# Patient Record
Sex: MALE | URBAN - METROPOLITAN AREA
[De-identification: names, ages, dates, MRNs, and addresses within clinical notes are randomized per-mention and may not be internally consistent; named-entity substitution may affect disease eponyms.]

---

## 2023-10-09 ENCOUNTER — OFFICE VISIT (OUTPATIENT)
Dept: INTERNAL MEDICINE CLINIC | Facility: CLINIC | Age: 38
End: 2023-10-09
Payer: COMMERCIAL

## 2023-10-09 VITALS
WEIGHT: 236.69 LBS | HEIGHT: 67.8 IN | DIASTOLIC BLOOD PRESSURE: 76 MMHG | OXYGEN SATURATION: 96 % | TEMPERATURE: 99 F | SYSTOLIC BLOOD PRESSURE: 116 MMHG | BODY MASS INDEX: 36.29 KG/M2 | HEART RATE: 90 BPM | RESPIRATION RATE: 16 BRPM

## 2023-10-09 DIAGNOSIS — Z00.00 ENCOUNTER FOR ANNUAL PHYSICAL EXAM: ICD-10-CM

## 2023-10-09 DIAGNOSIS — E78.5 HYPERLIPIDEMIA, UNSPECIFIED HYPERLIPIDEMIA TYPE: ICD-10-CM

## 2023-10-09 DIAGNOSIS — Z23 NEED FOR VACCINATION: ICD-10-CM

## 2023-10-09 DIAGNOSIS — Z31.41 FERTILITY TESTING: ICD-10-CM

## 2023-10-09 DIAGNOSIS — M25.531 RIGHT WRIST PAIN: ICD-10-CM

## 2023-10-09 DIAGNOSIS — Z13.0 SCREENING FOR DEFICIENCY ANEMIA: ICD-10-CM

## 2023-10-09 DIAGNOSIS — I10 PRIMARY HYPERTENSION: Primary | ICD-10-CM

## 2023-10-09 DIAGNOSIS — Z13.29 SCREENING FOR THYROID DISORDER: ICD-10-CM

## 2023-10-09 DIAGNOSIS — Z13.1 SCREENING FOR DIABETES MELLITUS: ICD-10-CM

## 2023-10-09 PROCEDURE — 3074F SYST BP LT 130 MM HG: CPT | Performed by: NURSE PRACTITIONER

## 2023-10-09 PROCEDURE — 3008F BODY MASS INDEX DOCD: CPT | Performed by: NURSE PRACTITIONER

## 2023-10-09 PROCEDURE — 99204 OFFICE O/P NEW MOD 45 MIN: CPT | Performed by: NURSE PRACTITIONER

## 2023-10-09 PROCEDURE — 3078F DIAST BP <80 MM HG: CPT | Performed by: NURSE PRACTITIONER

## 2023-10-09 PROCEDURE — 90471 IMMUNIZATION ADMIN: CPT | Performed by: NURSE PRACTITIONER

## 2023-10-09 PROCEDURE — 90686 IIV4 VACC NO PRSV 0.5 ML IM: CPT | Performed by: NURSE PRACTITIONER

## 2023-10-09 RX ORDER — FLUTICASONE PROPIONATE 50 MCG
2 SPRAY, SUSPENSION (ML) NASAL DAILY
COMMUNITY
Start: 2023-09-26

## 2023-10-09 RX ORDER — ATORVASTATIN CALCIUM 20 MG/1
20 TABLET, FILM COATED ORAL NIGHTLY
COMMUNITY
Start: 2023-07-10 | End: 2023-10-09

## 2023-10-09 RX ORDER — LOSARTAN POTASSIUM AND HYDROCHLOROTHIAZIDE 12.5; 5 MG/1; MG/1
1 TABLET ORAL DAILY
Qty: 30 TABLET | Refills: 0 | Status: SHIPPED | OUTPATIENT
Start: 2023-10-09

## 2023-10-09 RX ORDER — ATORVASTATIN CALCIUM 20 MG/1
20 TABLET, FILM COATED ORAL NIGHTLY
Qty: 30 TABLET | Refills: 0 | Status: SHIPPED | OUTPATIENT
Start: 2023-10-09

## 2023-10-09 RX ORDER — METHYLPREDNISOLONE 4 MG/1
TABLET ORAL
Qty: 1 EACH | Refills: 0 | Status: SHIPPED | OUTPATIENT
Start: 2023-10-09

## 2023-10-09 RX ORDER — LOSARTAN POTASSIUM AND HYDROCHLOROTHIAZIDE 12.5; 5 MG/1; MG/1
1 TABLET ORAL DAILY
COMMUNITY
Start: 2023-07-10 | End: 2023-10-09

## 2023-10-09 NOTE — PATIENT INSTRUCTIONS
Start the medrol pack. This is for the pain and inflammation in the right wrist.    See the hand specialist.    Apply ice to the wrist 1-2 times daily. Wear a wrist brace. Get your labs and semen analysis done. You should be fasting for at least 10 hours. If you take a multivitamin with Biotin or any biotin product it should be held for 3 days prior to getting your labs done.  If you use [facility] labs, you may schedule at [phone] or on-line at [facility].

## 2023-11-11 ENCOUNTER — LAB ENCOUNTER (OUTPATIENT)
Dept: LAB | Age: 38
End: 2023-11-11
Attending: NURSE PRACTITIONER
Payer: COMMERCIAL

## 2023-11-11 DIAGNOSIS — Z13.29 SCREENING FOR THYROID DISORDER: ICD-10-CM

## 2023-11-11 DIAGNOSIS — I10 PRIMARY HYPERTENSION: ICD-10-CM

## 2023-11-11 DIAGNOSIS — Z00.00 ENCOUNTER FOR ANNUAL PHYSICAL EXAM: ICD-10-CM

## 2023-11-11 DIAGNOSIS — E78.5 HYPERLIPIDEMIA, UNSPECIFIED HYPERLIPIDEMIA TYPE: ICD-10-CM

## 2023-11-11 DIAGNOSIS — Z13.0 SCREENING FOR DEFICIENCY ANEMIA: ICD-10-CM

## 2023-11-11 DIAGNOSIS — Z13.1 SCREENING FOR DIABETES MELLITUS: ICD-10-CM

## 2023-11-11 LAB
ALBUMIN SERPL-MCNC: 4 G/DL (ref 3.4–5)
ALBUMIN/GLOB SERPL: 1.1 {RATIO} (ref 1–2)
ALP LIVER SERPL-CCNC: 64 U/L
ALT SERPL-CCNC: 41 U/L
ANION GAP SERPL CALC-SCNC: 5 MMOL/L (ref 0–18)
AST SERPL-CCNC: 20 U/L (ref 15–37)
BASOPHILS # BLD AUTO: 0.03 X10(3) UL (ref 0–0.2)
BASOPHILS NFR BLD AUTO: 0.4 %
BILIRUB SERPL-MCNC: 1.6 MG/DL (ref 0.1–2)
BUN BLD-MCNC: 23 MG/DL (ref 9–23)
CALCIUM BLD-MCNC: 9 MG/DL (ref 8.5–10.1)
CHLORIDE SERPL-SCNC: 103 MMOL/L (ref 98–112)
CHOLEST SERPL-MCNC: 137 MG/DL (ref ?–200)
CO2 SERPL-SCNC: 30 MMOL/L (ref 21–32)
CREAT BLD-MCNC: 1.27 MG/DL
EGFRCR SERPLBLD CKD-EPI 2021: 73 ML/MIN/1.73M2 (ref 60–?)
EOSINOPHIL # BLD AUTO: 0.06 X10(3) UL (ref 0–0.7)
EOSINOPHIL NFR BLD AUTO: 0.8 %
ERYTHROCYTE [DISTWIDTH] IN BLOOD BY AUTOMATED COUNT: 13.8 %
EST. AVERAGE GLUCOSE BLD GHB EST-MCNC: 128 MG/DL (ref 68–126)
FASTING PATIENT LIPID ANSWER: YES
FASTING STATUS PATIENT QL REPORTED: YES
GLOBULIN PLAS-MCNC: 3.5 G/DL (ref 2.8–4.4)
GLUCOSE BLD-MCNC: 110 MG/DL (ref 70–99)
HBA1C MFR BLD: 6.1 % (ref ?–5.7)
HCT VFR BLD AUTO: 44.2 %
HDLC SERPL-MCNC: 43 MG/DL (ref 40–59)
HGB BLD-MCNC: 15.1 G/DL
IMM GRANULOCYTES # BLD AUTO: 0.03 X10(3) UL (ref 0–1)
IMM GRANULOCYTES NFR BLD: 0.4 %
LDLC SERPL CALC-MCNC: 61 MG/DL (ref ?–100)
LYMPHOCYTES # BLD AUTO: 1.84 X10(3) UL (ref 1–4)
LYMPHOCYTES NFR BLD AUTO: 25.3 %
MCH RBC QN AUTO: 28.3 PG (ref 26–34)
MCHC RBC AUTO-ENTMCNC: 34.2 G/DL (ref 31–37)
MCV RBC AUTO: 82.9 FL
MONOCYTES # BLD AUTO: 0.8 X10(3) UL (ref 0.1–1)
MONOCYTES NFR BLD AUTO: 11 %
NEUTROPHILS # BLD AUTO: 4.51 X10 (3) UL (ref 1.5–7.7)
NEUTROPHILS # BLD AUTO: 4.51 X10(3) UL (ref 1.5–7.7)
NEUTROPHILS NFR BLD AUTO: 62.1 %
NONHDLC SERPL-MCNC: 94 MG/DL (ref ?–130)
OSMOLALITY SERPL CALC.SUM OF ELEC: 290 MOSM/KG (ref 275–295)
PLATELET # BLD AUTO: 274 10(3)UL (ref 150–450)
POTASSIUM SERPL-SCNC: 3.2 MMOL/L (ref 3.5–5.1)
PROT SERPL-MCNC: 7.5 G/DL (ref 6.4–8.2)
RBC # BLD AUTO: 5.33 X10(6)UL
SODIUM SERPL-SCNC: 138 MMOL/L (ref 136–145)
TRIGL SERPL-MCNC: 203 MG/DL (ref 30–149)
TSI SER-ACNC: 0.65 MIU/ML (ref 0.36–3.74)
VLDLC SERPL CALC-MCNC: 30 MG/DL (ref 0–30)
WBC # BLD AUTO: 7.3 X10(3) UL (ref 4–11)

## 2023-11-11 PROCEDURE — 36415 COLL VENOUS BLD VENIPUNCTURE: CPT

## 2023-11-11 PROCEDURE — 84443 ASSAY THYROID STIM HORMONE: CPT

## 2023-11-11 PROCEDURE — 83036 HEMOGLOBIN GLYCOSYLATED A1C: CPT

## 2023-11-11 PROCEDURE — 80061 LIPID PANEL: CPT

## 2023-11-11 PROCEDURE — 80053 COMPREHEN METABOLIC PANEL: CPT

## 2023-11-11 PROCEDURE — 85025 COMPLETE CBC W/AUTO DIFF WBC: CPT

## 2023-11-14 RX ORDER — LOSARTAN POTASSIUM AND HYDROCHLOROTHIAZIDE 12.5; 5 MG/1; MG/1
1 TABLET ORAL DAILY
Qty: 14 TABLET | Refills: 0 | Status: SHIPPED | OUTPATIENT
Start: 2023-11-14

## 2023-11-14 NOTE — TELEPHONE ENCOUNTER
Last OV relevant to medication: 10/9/23  Last refill date: 10/9/23 #30/refills: 0  When pt was asked to return for OV: 11/9/23   Upcoming appt/reason:   Future Appointments   Date Time Provider Ezequiel Chinyere   11/21/2023  4:00 PM Arsenio Fulton MD [facility] [place]   Was pt informed of any over due labs: utd  Lab Results   Component Value Date     (H) 11/11/2023    BUN 23 11/11/2023    CREATSERUM 1.27 11/11/2023    ANIONGAP 5 11/11/2023    CA 9.0 11/11/2023    OSMOCALC 290 11/11/2023    ALKPHO 64 11/11/2023    AST 20 11/11/2023    ALT 41 11/11/2023    BILT 1.6 11/11/2023    TP 7.5 11/11/2023    ALB 4.0 11/11/2023    GLOBULIN 3.5 11/11/2023     11/11/2023    K 3.2 (L) 11/11/2023     11/11/2023    CO2 30.0 11/11/2023     Looks like pt is establishing with new pcp 11/21, okay to send short term supply? Pended with note to pharmacy, thanks!

## 2023-11-21 ENCOUNTER — OFFICE VISIT (OUTPATIENT)
Dept: INTERNAL MEDICINE CLINIC | Facility: CLINIC | Age: 38
End: 2023-11-21
Payer: COMMERCIAL

## 2023-11-21 VITALS
BODY MASS INDEX: 35.92 KG/M2 | DIASTOLIC BLOOD PRESSURE: 80 MMHG | HEIGHT: 68 IN | WEIGHT: 237 LBS | TEMPERATURE: 99 F | HEART RATE: 106 BPM | SYSTOLIC BLOOD PRESSURE: 136 MMHG | OXYGEN SATURATION: 98 %

## 2023-11-21 DIAGNOSIS — R73.03 PREDIABETES: ICD-10-CM

## 2023-11-21 DIAGNOSIS — R70.0 ESR RAISED: ICD-10-CM

## 2023-11-21 DIAGNOSIS — R09.89 RHONCHI: ICD-10-CM

## 2023-11-21 DIAGNOSIS — E55.9 VITAMIN D DEFICIENCY: ICD-10-CM

## 2023-11-21 DIAGNOSIS — E66.9 OBESITY (BMI 30-39.9): ICD-10-CM

## 2023-11-21 DIAGNOSIS — R79.89 LOW TESTOSTERONE IN MALE: Primary | ICD-10-CM

## 2023-11-21 DIAGNOSIS — E87.6 HYPOKALEMIA: ICD-10-CM

## 2023-11-21 DIAGNOSIS — I10 HYPERTENSION, UNSPECIFIED TYPE: ICD-10-CM

## 2023-11-21 DIAGNOSIS — Z31.41 FERTILITY TESTING: ICD-10-CM

## 2023-11-21 PROCEDURE — 3008F BODY MASS INDEX DOCD: CPT | Performed by: INTERNAL MEDICINE

## 2023-11-21 PROCEDURE — 3075F SYST BP GE 130 - 139MM HG: CPT | Performed by: INTERNAL MEDICINE

## 2023-11-21 PROCEDURE — 99204 OFFICE O/P NEW MOD 45 MIN: CPT | Performed by: INTERNAL MEDICINE

## 2023-11-21 PROCEDURE — 3079F DIAST BP 80-89 MM HG: CPT | Performed by: INTERNAL MEDICINE

## 2023-11-22 ENCOUNTER — PATIENT OUTREACH (OUTPATIENT)
Dept: CASE MANAGEMENT | Age: 38
End: 2023-11-22

## 2023-11-22 ENCOUNTER — TELEPHONE (OUTPATIENT)
Dept: INTERNAL MEDICINE CLINIC | Facility: CLINIC | Age: 38
End: 2023-11-22

## 2023-11-22 NOTE — PROCEDURES
The office order for PCP removal request is Approved and finalized on November 22, 2023.     Thanks,  [facility] [employer]

## 2023-11-25 ENCOUNTER — HOSPITAL ENCOUNTER (OUTPATIENT)
Dept: GENERAL RADIOLOGY | Facility: HOSPITAL | Age: 38
Discharge: HOME | End: 2023-11-25
Attending: INTERNAL MEDICINE
Payer: COMMERCIAL

## 2023-11-25 ENCOUNTER — LAB ENCOUNTER (OUTPATIENT)
Dept: LAB | Age: 38
End: 2023-11-25
Attending: INTERNAL MEDICINE
Payer: COMMERCIAL

## 2023-11-25 DIAGNOSIS — E87.6 HYPOKALEMIA: ICD-10-CM

## 2023-11-25 DIAGNOSIS — R09.89 RHONCHI: ICD-10-CM

## 2023-11-25 DIAGNOSIS — R70.0 ESR RAISED: ICD-10-CM

## 2023-11-25 DIAGNOSIS — Z31.41 FERTILITY TESTING: ICD-10-CM

## 2023-11-25 DIAGNOSIS — E55.9 VITAMIN D DEFICIENCY: ICD-10-CM

## 2023-11-25 DIAGNOSIS — R79.89 LOW TESTOSTERONE IN MALE: ICD-10-CM

## 2023-11-25 LAB
ANION GAP SERPL CALC-SCNC: 5 MMOL/L (ref 0–18)
BUN BLD-MCNC: 19 MG/DL (ref 9–23)
CALCIUM BLD-MCNC: 8.9 MG/DL (ref 8.5–10.1)
CHLORIDE SERPL-SCNC: 110 MMOL/L (ref 98–112)
CO2 SERPL-SCNC: 26 MMOL/L (ref 21–32)
CREAT BLD-MCNC: 1.05 MG/DL
CRP SERPL-MCNC: <0.29 MG/DL (ref ?–0.3)
EGFRCR SERPLBLD CKD-EPI 2021: 92 ML/MIN/1.73M2 (ref 60–?)
ERYTHROCYTE [SEDIMENTATION RATE] IN BLOOD: 8 MM/HR
FASTING STATUS PATIENT QL REPORTED: NO
GLUCOSE BLD-MCNC: 104 MG/DL (ref 70–99)
MAGNESIUM SERPL-MCNC: 2.2 MG/DL (ref 1.6–2.6)
OSMOLALITY SERPL CALC.SUM OF ELEC: 295 MOSM/KG (ref 275–295)
POTASSIUM SERPL-SCNC: 3.5 MMOL/L (ref 3.5–5.1)
SODIUM SERPL-SCNC: 141 MMOL/L (ref 136–145)
VIT D+METAB SERPL-MCNC: 7.4 NG/ML (ref 30–100)

## 2023-11-25 PROCEDURE — 83735 ASSAY OF MAGNESIUM: CPT

## 2023-11-25 PROCEDURE — 82306 VITAMIN D 25 HYDROXY: CPT

## 2023-11-25 PROCEDURE — 86038 ANTINUCLEAR ANTIBODIES: CPT

## 2023-11-25 PROCEDURE — 80048 BASIC METABOLIC PNL TOTAL CA: CPT

## 2023-11-25 PROCEDURE — 85652 RBC SED RATE AUTOMATED: CPT

## 2023-11-25 PROCEDURE — 84403 ASSAY OF TOTAL TESTOSTERONE: CPT

## 2023-11-25 PROCEDURE — 36415 COLL VENOUS BLD VENIPUNCTURE: CPT

## 2023-11-25 PROCEDURE — 86140 C-REACTIVE PROTEIN: CPT

## 2023-11-25 PROCEDURE — 71046 X-RAY EXAM CHEST 2 VIEWS: CPT | Performed by: INTERNAL MEDICINE

## 2023-11-25 PROCEDURE — 84402 ASSAY OF FREE TESTOSTERONE: CPT

## 2023-11-27 ENCOUNTER — TELEPHONE (OUTPATIENT)
Dept: INTERNAL MEDICINE CLINIC | Facility: CLINIC | Age: 38
End: 2023-11-27

## 2023-11-27 DIAGNOSIS — E55.9 VITAMIN D DEFICIENCY: ICD-10-CM

## 2023-11-27 DIAGNOSIS — Z51.81 MEDICATION MONITORING ENCOUNTER: ICD-10-CM

## 2023-11-27 DIAGNOSIS — R73.03 PREDIABETES: Primary | ICD-10-CM

## 2023-11-27 DIAGNOSIS — I10 PRIMARY HYPERTENSION: ICD-10-CM

## 2023-11-27 DIAGNOSIS — E66.9 OBESITY (BMI 30-39.9): ICD-10-CM

## 2023-11-27 LAB — NUCLEAR IGG TITR SER IF: NEGATIVE {TITER}

## 2023-11-27 RX ORDER — LOSARTAN POTASSIUM AND HYDROCHLOROTHIAZIDE 12.5; 5 MG/1; MG/1
1 TABLET ORAL DAILY
Qty: 90 TABLET | Refills: 3 | Status: SHIPPED | OUTPATIENT
Start: 2023-11-27

## 2023-11-27 RX ORDER — LANCETS 33 GAUGE
1 EACH MISCELLANEOUS DAILY
Qty: 100 EACH | Refills: 0 | Status: SHIPPED | OUTPATIENT
Start: 2023-11-27 | End: 2024-11-26

## 2023-11-27 RX ORDER — LANCETS 30 GAUGE
1 EACH MISCELLANEOUS DAILY
Qty: 1 KIT | Refills: 0 | COMMUNITY
Start: 2023-11-27 | End: 2024-11-26

## 2023-11-27 RX ORDER — BLOOD SUGAR DIAGNOSTIC
STRIP MISCELLANEOUS
Qty: 100 EACH | Refills: 0 | Status: SHIPPED | OUTPATIENT
Start: 2023-11-27

## 2023-11-27 NOTE — TELEPHONE ENCOUNTER
Spoke to Ray Lance, on verbal release. States patient began to feel fatigued/weak yesterday after taking Metformin. Now taking it bid. Also felt dizzy but resolved. Denies chills, fever, body/head aches, CP, SOB, vision changes, sweats, tremors, GI symptoms. States patient felt better today and is currently at work. Recent glucose reading on 11/25, 105. Patient does not have glucometer at home. Pended kit to pharmacy. States BP readings have been normal but does not have results available. Advised patient to monitor symptoms and inform us if recurs. Reviewed recent labs/low vitamin D levels and explained sometimes low vitamin D can cause fatigue. Ray Lance, wondering if they can also have referral for nutritionist? Pended order.

## 2023-11-27 NOTE — TELEPHONE ENCOUNTER
Stacie Louise spouse   Ph [phone]    Patient started metformin 11/25/23  Twice daily, once in am with meal, once in pm with meal   C/O weakness, fatigue and dizziness   Onset yesterday approx noon  Did check blood pressure at home after sxs onset, Allen Mcguire did not have reading available at time of call, does state it was normal for him    Also asks for referral to nutritionist for weight loss and diet modification     Call Allen Mcguire with [facility] - okay per verbal release    : Urszula Shannon [phone]
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
## 2023-11-27 NOTE — TELEPHONE ENCOUNTER
Spoke to [facility] and informed. Also requesting semen analysis order to be faxed to Dr Della Yan to schedule.    Fax [phone]

## 2023-11-27 NOTE — TELEPHONE ENCOUNTER
Agree with recs. Please also ask him to reduce metformin to 500mg daily   Signed orders for glucometer and nutritionist.  Thanks!

## 2023-11-29 DIAGNOSIS — E78.5 HYPERLIPIDEMIA, UNSPECIFIED HYPERLIPIDEMIA TYPE: ICD-10-CM

## 2023-11-29 LAB
FREE TESTOST DIRECT: 7.7 PG/ML
TESTOSTERONE: 274 NG/DL

## 2023-11-30 RX ORDER — ATORVASTATIN CALCIUM 20 MG/1
20 TABLET, FILM COATED ORAL NIGHTLY
Qty: 30 TABLET | Refills: 0 | OUTPATIENT
Start: 2023-11-30

## 2024-01-08 ENCOUNTER — LAB ENCOUNTER (OUTPATIENT)
Dept: LAB | Facility: HOSPITAL | Age: 39
End: 2024-01-08
Attending: INTERNAL MEDICINE
Payer: COMMERCIAL

## 2024-01-08 DIAGNOSIS — Z31.41 FERTILITY TESTING: ICD-10-CM

## 2024-01-08 PROCEDURE — 89320 SEMEN ANAL VOL/COUNT/MOT: CPT

## 2024-01-11 ENCOUNTER — TELEPHONE (OUTPATIENT)
Dept: INTERNAL MEDICINE CLINIC | Facility: CLINIC | Age: 39
End: 2024-01-11

## 2024-01-11 NOTE — TELEPHONE ENCOUNTER
Please let him know that we received fax with semen analysis. Per report it was \"essentially normal semen analysis.\" Should follow -up with urology as directed.   Will fax over copy to urology office; per chart review he is scheduled with Dr. Goff

## 2024-01-11 NOTE — TELEPHONE ENCOUNTER
Outgoing Fax  I faxed over lab report received for Semen analysis per Dr. Zamorano asked me to fax report STATUS CP placed copy to scan and another copy in accrodion

## 2024-01-17 ENCOUNTER — OFFICE VISIT (OUTPATIENT)
Dept: SURGERY | Facility: CLINIC | Age: 39
End: 2024-01-17

## 2024-01-17 DIAGNOSIS — R79.89 LOW TESTOSTERONE IN MALE: Primary | ICD-10-CM

## 2024-01-17 LAB
APPEARANCE: CLEAR
BILIRUBIN: NEGATIVE
GLUCOSE (URINE DIPSTICK): NEGATIVE MG/DL
KETONES (URINE DIPSTICK): NEGATIVE MG/DL
LEUKOCYTES: NEGATIVE
MULTISTIX LOT#: NORMAL NUMERIC
NITRITE, URINE: NEGATIVE
OCCULT BLOOD: NEGATIVE
PH, URINE: 5.5 (ref 4.5–8)
PROTEIN (URINE DIPSTICK): NEGATIVE MG/DL
SPECIFIC GRAVITY: 1.02 (ref 1–1.03)
URINE-COLOR: YELLOW
UROBILINOGEN,SEMI-QN: 0.2 MG/DL (ref 0–1.9)

## 2024-01-17 PROCEDURE — 99243 OFF/OP CNSLTJ NEW/EST LOW 30: CPT | Performed by: UROLOGY

## 2024-01-17 PROCEDURE — 81003 URINALYSIS AUTO W/O SCOPE: CPT | Performed by: UROLOGY

## 2024-01-17 NOTE — PROGRESS NOTES
Urology Clinic Note - New Patient    Referring Provider:  Aviva Canas MD  [address]  [address]  [place] [address]     Primary Care Provider:  Aviva Canas MD      Swedish interpretor used.      Chief Complaint:     Infertility, concern for low testosterone     HPI:     Shailesh Amin is a 38 year old male with history of HTN, HLD referred for infertility, low testosterone.     Patient reports seeing a urologist in [facility] many years ago for retractile testicles; this has resolved.   Patient reports long history of infertility- 10 years of intercourse without protection with wife. He also recently reported decreated energy, weight gain.  Had a SA 1/8/24 wt normal results (with Dr. Carranza's office), he brings results in today (see below for full results) which demonstrated overall normal findings; low normal forms by Krugers morphology (5).     Of note, his partner is 34; she has not had a previous pregnancy and has not been evaluated yet but is seeing ob/gyn for this in a couple weeks.   Patient also with recent low libido, low energy, weight gain. He also had T checked by PCP;  274.   He has no other urology. No urinary complaints. No ED.          PSA:  No results found for: \"PSA\", \"PERCENTPSA\", \"PSAS\", \"PSAULTRA\", \"QPSA\", \"PSATOT\", \"TOTPSADX\", \"TOTPSASCREEN\"   Last Cr was 1.05 done on 11/25/2023.      History:     Past Medical History:   Diagnosis Date    Essential hypertension     Hyperlipidemia        Past Surgical History:   Procedure Laterality Date    TONSILLECTOMY         Family History   Problem Relation Age of Onset    Hypertension Mother     Kidney Cancer Mother         on Dialysis    Heart Attack Father     No Known Problems Sister        Social History     Socioeconomic History    Marital status:    Tobacco Use    Smoking status: Never     Passive exposure: Never    Smokeless tobacco: Never   Vaping Use    Vaping Use: Never used   Substance and Sexual  Activity    Alcohol use: Not Currently    Drug use: Never    Sexual activity: Yes     Partners: Female   Other Topics Concern     Service No    Blood Transfusions No    Caffeine Concern No    Occupational Exposure No    Hobby Hazards No    Sleep Concern No    Stress Concern No    Special Diet No    Back Care Yes    Exercise No    Bike Helmet Yes    Seat Belt Yes    Self-Exams Yes       Medications (Active prior to today's visit):  Current Outpatient Medications   Medication Sig Dispense Refill    Blood Glucose Monitoring Suppl (ONETOUCH ULTRA 2) w/Device Does not apply Kit 1 Device by Other route daily. 1 kit 0    ergocalciferol 1.25 MG (36045 UT) Oral Cap Take 1 capsule (50,000 Units total) by mouth once a week for 12 doses. 12 capsule 0    losartan-hydroCHLOROthiazide 50-12.5 MG Oral Tab Take 1 tablet by mouth daily. 90 tablet 3    Glucose Blood (ONETOUCH ULTRA) In Vitro Strip Tests 1 x daily 100 each 0    OneTouch Delica Lancets 33G Does not apply Misc 1 Lancet by Finger stick route daily. 100 each 0    metFORMIN 500 MG Oral Tab Take 1 tablet (500 mg total) by mouth daily with breakfast.      atorvastatin 20 MG Oral Tab Take 1 tablet (20 mg total) by mouth nightly. 30 tablet 0       Allergies:  No Known Allergies      Review of Systems:   A comprehensive 10-point review of systems was completed.  Pertinent positives and negatives are noted in the the HPI.    Physical Exam:   CONSTITUTIONAL: Well developed, well nourished, in no acute distress  NEUROLOGIC: Alert and oriented  HEAD: Normocephalic, atraumatic  ENT: Hearing intact   RESPIRATORY: Normal respiratory effort  SKIN: No evident rashes  ABDOMEN: Soft, non-tender, non-distended  GENITOURINARY: Normal phallus, orthotopic meatus, normal bilateral testicles; in dependent portion of scrotum    No results found.              Assessment & Plan:     Shailesh Amin is a 38 year old male with history of HTN, HLD referred for infertility, low  testosterone.     # Discussed patients history in detail. His exam today is favorable. Unclear etiology of infertility; SA Is overall reassuring even with low Krugers morphology. I encouraged patient and wife to continue with workup for wifes fertility (not yet worked up). After this would likely continue with plans for IVF/ICI if continued issues with natural conception.   Patient also with low T which appears symptomatic. We discussed T therapy and the risk of worsening infertility; will check LH, prolactin, estradiol, fsh. Will send to endocrinology for consideration of clomid and any further testing that may be indicated. He will return in a few weeks to discuss the above.       Thank you for this consult.    I have personally reviewed all relevant medical records, labs, and imaging.       Chele Goff MD  Staff Urologist  [facility]  Office: [phone]

## 2024-02-15 RX ORDER — ERGOCALCIFEROL 1.25 MG/1
50000 CAPSULE ORAL WEEKLY
Qty: 12 CAPSULE | Refills: 0 | OUTPATIENT
Start: 2024-02-15 | End: 2024-05-03

## 2024-02-20 DIAGNOSIS — R73.03 PREDIABETES: ICD-10-CM

## 2024-02-20 DIAGNOSIS — E66.9 OBESITY (BMI 30-39.9): ICD-10-CM

## 2024-02-21 NOTE — TELEPHONE ENCOUNTER
PROTOCOL FAILED    Name from pharmacy: METFORMIN  MG TABLET         Will file in chart as: METFORMIN 500 MG Oral Tab    Sig: TAKE 1 TABLET BY MOUTH TWICE A DAY WITH MEALS    Disp: 180 tablet    Refills: 0 (Pharmacy requested: Not specified)    Start: 2/20/2024    Class: Normal    Non-formulary For: Prediabetes, Obesity (BMI 30-39.9)    Last ordered: 2 months ago (11/27/2023) by Aviva Cruz MD    Last refill: 11/21/2023    Rx #: [id number]    Diabetes Medication Protocol Ejbbzu9702/20/2024 01:01 AM   Protocol Details Microalbumin procedure in past 12 months or taking ACE/ARB    Last A1C < 7.5 and within past 6 months    In person appointment or virtual visit in the past 6 mos or appointment in next 3 mos    EGFRCR or GFRNAA > 50    GFR in the past 12 months      To be filled at: [facility] [address] [place], [place] - [address] [phone], [phone]        LOV: 11/21/23 w/ DV  RTC: 01/02/24   RECENT LABS: 11/25/23   FOV: 03/04/24

## 2024-02-24 ENCOUNTER — LAB ENCOUNTER (OUTPATIENT)
Dept: LAB | Age: 39
End: 2024-02-24
Attending: INTERNAL MEDICINE
Payer: COMMERCIAL

## 2024-02-24 DIAGNOSIS — E55.9 VITAMIN D DEFICIENCY: ICD-10-CM

## 2024-02-24 LAB — VIT D+METAB SERPL-MCNC: 44.1 NG/ML (ref 30–100)

## 2024-02-24 PROCEDURE — 82306 VITAMIN D 25 HYDROXY: CPT

## 2024-02-24 PROCEDURE — 36415 COLL VENOUS BLD VENIPUNCTURE: CPT

## 2024-03-04 ENCOUNTER — OFFICE VISIT (OUTPATIENT)
Dept: INTERNAL MEDICINE CLINIC | Facility: CLINIC | Age: 39
End: 2024-03-04
Payer: COMMERCIAL

## 2024-03-04 VITALS
WEIGHT: 234.38 LBS | RESPIRATION RATE: 15 BRPM | OXYGEN SATURATION: 95 % | HEART RATE: 75 BPM | TEMPERATURE: 98 F | HEIGHT: 68 IN | BODY MASS INDEX: 35.52 KG/M2 | SYSTOLIC BLOOD PRESSURE: 128 MMHG | DIASTOLIC BLOOD PRESSURE: 80 MMHG

## 2024-03-04 DIAGNOSIS — E34.9 TESTOSTERONE INSUFFICIENCY: ICD-10-CM

## 2024-03-04 DIAGNOSIS — E78.5 HYPERLIPIDEMIA, UNSPECIFIED HYPERLIPIDEMIA TYPE: ICD-10-CM

## 2024-03-04 DIAGNOSIS — I10 HYPERTENSION, UNSPECIFIED TYPE: ICD-10-CM

## 2024-03-04 DIAGNOSIS — R73.03 PREDIABETES: Primary | ICD-10-CM

## 2024-03-04 DIAGNOSIS — Z78.9 ATTEMPTING TO CONCEIVE: ICD-10-CM

## 2024-03-04 PROCEDURE — 99214 OFFICE O/P EST MOD 30 MIN: CPT | Performed by: INTERNAL MEDICINE

## 2024-03-04 RX ORDER — ATORVASTATIN CALCIUM 20 MG/1
20 TABLET, FILM COATED ORAL NIGHTLY
Qty: 30 TABLET | Refills: 0 | Status: SHIPPED | OUTPATIENT
Start: 2024-03-04

## 2024-03-04 NOTE — PROGRESS NOTES
Subjective:   Shailesh Amin is a 38 year old male  who presents for Test Results     Hx of vitamin d deficiency- on supplements   Feels better     preDM- on metformin   Due for labs soon.     Low normal testosterone - saw urology. Recommended to see endocrinology.   He and wife have been trying to conceive.     History/Other:    Chief Complaint Reviewed and Verified  No Further Nursing Notes to   Review  Tobacco Reviewed  Allergies Reviewed  Medications Reviewed    Medical History Reviewed  Surgical History Reviewed  Family History   Reviewed  Social History Reviewed         Current Outpatient Medications   Medication Sig Dispense Refill    METFORMIN 500 MG Oral Tab TAKE 1 TABLET BY MOUTH TWICE A DAY WITH MEALS 180 tablet 0    losartan-hydroCHLOROthiazide 50-12.5 MG Oral Tab Take 1 tablet by mouth daily. 90 tablet 3    atorvastatin 20 MG Oral Tab Take 1 tablet (20 mg total) by mouth nightly. 30 tablet 0    Blood Glucose Monitoring Suppl (ONETOUCH ULTRA 2) w/Device Does not apply Kit 1 Device by Other route daily. (Patient not taking: Reported on 3/4/2024) 1 kit 0    Glucose Blood (ONETOUCH ULTRA) In Vitro Strip Tests 1 x daily (Patient not taking: Reported on 3/4/2024) 100 each 0    OneTouch Delica Lancets 33G Does not apply Misc 1 Lancet by Finger stick route daily. (Patient not taking: Reported on 3/4/2024) 100 each 0       Review of Systems:  Pertinent items are noted in HPI.  A comprehensive 10 point review of systems was completed.  Pertinent positives and negatives noted in the the HPI.        Objective:   /80 (BP Location: Left arm, Patient Position: Sitting, Cuff Size: adult)   Pulse 75   Temp 98.2 °F (36.8 °C) (Temporal)   Resp 15   Ht 5' 8\" (1.727 m)   Wt 234 lb 6.4 oz (106.3 kg)   SpO2 95%   BMI 35.64 kg/m²  Estimated body mass index is 35.64 kg/m² as calculated from the following:    Height as of this encounter: 5' 8\" (1.727 m).    Weight as of this encounter: 234 lb  6.4 oz (106.3 kg).  PHYSICAL EXAM:   General: no acute distress   Respiratory: no increased work of breathing; good air exchange; CTAB; no crackles or wheezing   Cardiovascular: RRR; S1, S2; no murmurs  Neurological: awake, alert, oriented x3; CNII-XII grossly intact;   Behavioral/Psych: euthymic; appropriate affect     Assessment & Plan:     Shailesh Stevenson was seen today for test results.    Diagnoses and all orders for this visit:    Prediabetes  -     Hemoglobin A1C [E]; Future  -     Basic Metabolic Panel (8) [E]; Future  -metformin   -diet/exercise     Hyperlipidemia, unspecified hyperlipidemia type  -     atorvastatin 20 MG Oral Tab; Take 1 tablet (20 mg total) by mouth nightly.  -     Lipid Panel [E]; Future    Testosterone borderline  Attempting to conceive   -     Endocrine Referral - [facility] (Heidi Olea)    HTN- controlled. Continue losartan-hydrochlorothiazide       Aviva Cruz MD

## 2024-04-04 DIAGNOSIS — E78.5 HYPERLIPIDEMIA, UNSPECIFIED HYPERLIPIDEMIA TYPE: ICD-10-CM

## 2024-04-04 RX ORDER — ATORVASTATIN CALCIUM 20 MG/1
20 TABLET, FILM COATED ORAL NIGHTLY
Qty: 90 TABLET | Refills: 0 | Status: SHIPPED | OUTPATIENT
Start: 2024-04-04

## 2024-04-04 NOTE — TELEPHONE ENCOUNTER
Atorvastatin 20 mg  Filled 3-4-24  Qty 30  0 refills  No future appointments.  LOV 3-4-24 DV  RTC none  Labs 11-11-23 Lipid

## 2024-05-21 DIAGNOSIS — E66.9 OBESITY (BMI 30-39.9): ICD-10-CM

## 2024-05-21 DIAGNOSIS — R73.03 PREDIABETES: ICD-10-CM

## 2024-05-21 NOTE — TELEPHONE ENCOUNTER
Requesting    Name from pharmacy: METFORMIN  MG TABLET         Will file in chart as: METFORMIN 500 MG Oral Tab    Sig: TAKE 1 TABLET BY MOUTH TWICE A DAY WITH FOOD    Disp: 180 tablet    Refills: 0 (Pharmacy requested: Not specified)    Start: 5/21/2024    Class: Normal    Non-formulary For: Prediabetes, Obesity (BMI 30-39.9)    Last ordered: 3 months ago (2/21/2024) by Aviva Cruz MD    Last refill: 2/21/2024    Rx #: [id number]    Diabetes Medication Protocol Quyfgy5105/21/2024 12:35 AM   Protocol Details Last A1C < 7.5 and within past 6 months    Microalbumin procedure in past 12 months or taking ACE/ARB    In person appointment or virtual visit in the past 6 mos or appointment in next 3 mos    EGFRCR or GFRNAA > 50    GFR in the past 12 months      To be filled at: [facility] [address] [address] [address] [phone], [phone]     LOV: 03/04/24 w/ DV  RTC: No Follow Up on File   Last Relevant Labs: 11/25/23  Filled: 02/21/24 #180 with 0 refills    No future appointments.

## 2024-07-04 DIAGNOSIS — E78.5 HYPERLIPIDEMIA, UNSPECIFIED HYPERLIPIDEMIA TYPE: ICD-10-CM

## 2024-07-05 NOTE — TELEPHONE ENCOUNTER
Requesting    Name from pharmacy: ATORVASTATIN 20 MG TABLET         Will file in chart as: ATORVASTATIN 20 MG Oral Tab    Sig: TAKE 1 TABLET BY MOUTH EVERY DAY AT NIGHT    Disp: 90 tablet    Refills: 0 (Pharmacy requested: Not specified)    Start: 7/4/2024    Class: Normal    Non-formulary For: Hyperlipidemia, unspecified hyperlipidemia type    Last ordered: 3 months ago (4/4/2024) by Aviva Cruz MD    Last refill: 4/4/2024    Rx #: [id number]    Cholesterol Medication Protocol Iprqbw5307/04/2024 11:17 AM   Protocol Details ALT < 80    ALT resulted within past year    Lipid panel within past 12 months    In person appointment or virtual visit in the past 12 mos or appointment in next 3 mos      To be filled at: [facility] [address] [facility] [address] [phone], [phone]     LOV: 03/04/24 w/ DV  RTC: No Follow Up on File   Last Relevant Labs: 11/25/23  Filled: 04/04/24 #90 with 0 refills    No future appointments.

## 2024-07-07 RX ORDER — ATORVASTATIN CALCIUM 20 MG/1
20 TABLET, FILM COATED ORAL NIGHTLY
Qty: 90 TABLET | Refills: 0 | Status: SHIPPED | OUTPATIENT
Start: 2024-07-07

## 2024-08-14 DIAGNOSIS — I10 PRIMARY HYPERTENSION: ICD-10-CM

## 2024-08-14 NOTE — TELEPHONE ENCOUNTER
Requesting refill for:   losartan-hydroCHLOROthiazide 50-12.5 MG Oral Tab 90 tablet    Pharmacy: [facility] [id number] [facility]     # [phone]

## 2024-08-15 RX ORDER — LOSARTAN POTASSIUM AND HYDROCHLOROTHIAZIDE 12.5; 5 MG/1; MG/1
1 TABLET ORAL DAILY
Qty: 90 TABLET | Refills: 0 | Status: SHIPPED | OUTPATIENT
Start: 2024-08-15

## 2024-08-15 NOTE — TELEPHONE ENCOUNTER
Requesting    losartan-hydroCHLOROthiazide 50-12.5 MG Oral Tab         Sig: Take 1 tablet by mouth daily.    Disp: 90 tablet    Refills: 0    Start: 8/15/2024    Class: Normal    Non-formulary For: Primary hypertension    Last ordered: 8 months ago (11/27/2023) by Aviva Cruz MD    Hypertension Medications Protocol Vsehig20/15/2024 10:47 AM   Protocol Details CMP or BMP in past 12 months    Last BP reading less than 140/90    In person appointment or virtual visit in the past 12 mos or appointment in next 3 mos    EGFRCR or GFRNAA > 50      To be filled at: [facility] [address] [place] [facility] [address] [phone], [phone]        LOV: 03/04/24 w/ DVF  RTC: No Follow Up on File   Last Relevant Labs: 11/2023  Filled: 11/27/23 #90 with 3 refills    Future Appointments   Date Time Provider Department Center   8/21/2024  3:00 PM Aviva Canas MD [facility] [place]

## 2024-08-18 ENCOUNTER — HOSPITAL ENCOUNTER (EMERGENCY)
Facility: HOSPITAL | Age: 39
Discharge: HOME | End: 2024-08-19
Attending: EMERGENCY MEDICINE
Payer: COMMERCIAL

## 2024-08-18 DIAGNOSIS — I15.9 SECONDARY HYPERTENSION: Primary | ICD-10-CM

## 2024-08-18 DIAGNOSIS — Z76.0 ENCOUNTER FOR MEDICATION REFILL: ICD-10-CM

## 2024-08-18 DIAGNOSIS — R51.9 NONINTRACTABLE HEADACHE, UNSPECIFIED CHRONICITY PATTERN, UNSPECIFIED HEADACHE TYPE: ICD-10-CM

## 2024-08-18 PROCEDURE — 99285 EMERGENCY DEPT VISIT HI MDM: CPT

## 2024-08-18 PROCEDURE — 93010 ELECTROCARDIOGRAM REPORT: CPT

## 2024-08-18 PROCEDURE — 96375 TX/PRO/DX INJ NEW DRUG ADDON: CPT

## 2024-08-18 PROCEDURE — 96374 THER/PROPH/DIAG INJ IV PUSH: CPT

## 2024-08-18 PROCEDURE — 96361 HYDRATE IV INFUSION ADD-ON: CPT

## 2024-08-19 ENCOUNTER — APPOINTMENT (OUTPATIENT)
Dept: CT IMAGING | Facility: HOSPITAL | Age: 39
End: 2024-08-19
Attending: EMERGENCY MEDICINE
Payer: COMMERCIAL

## 2024-08-19 ENCOUNTER — APPOINTMENT (OUTPATIENT)
Dept: GENERAL RADIOLOGY | Facility: HOSPITAL | Age: 39
End: 2024-08-19
Payer: COMMERCIAL

## 2024-08-19 VITALS
SYSTOLIC BLOOD PRESSURE: 135 MMHG | BODY MASS INDEX: 31 KG/M2 | TEMPERATURE: 98 F | OXYGEN SATURATION: 100 % | WEIGHT: 207 LBS | HEART RATE: 64 BPM | RESPIRATION RATE: 13 BRPM | DIASTOLIC BLOOD PRESSURE: 84 MMHG

## 2024-08-19 LAB
ALBUMIN SERPL-MCNC: 4.6 G/DL (ref 3.2–4.8)
ALBUMIN/GLOB SERPL: 2 {RATIO} (ref 1–2)
ALP LIVER SERPL-CCNC: 68 U/L
ALT SERPL-CCNC: 23 U/L
ANION GAP SERPL CALC-SCNC: 6 MMOL/L (ref 0–18)
AST SERPL-CCNC: 19 U/L (ref ?–34)
ATRIAL RATE: 70 BPM
BASOPHILS # BLD AUTO: 0.03 X10(3) UL (ref 0–0.2)
BASOPHILS NFR BLD AUTO: 0.4 %
BILIRUB SERPL-MCNC: 1.3 MG/DL (ref 0.3–1.2)
BUN BLD-MCNC: 12 MG/DL (ref 9–23)
CALCIUM BLD-MCNC: 9.3 MG/DL (ref 8.7–10.4)
CHLORIDE SERPL-SCNC: 106 MMOL/L (ref 98–112)
CO2 SERPL-SCNC: 27 MMOL/L (ref 21–32)
CREAT BLD-MCNC: 0.93 MG/DL
EGFRCR SERPLBLD CKD-EPI 2021: 106 ML/MIN/1.73M2 (ref 60–?)
EOSINOPHIL # BLD AUTO: 0.24 X10(3) UL (ref 0–0.7)
EOSINOPHIL NFR BLD AUTO: 3.5 %
ERYTHROCYTE [DISTWIDTH] IN BLOOD BY AUTOMATED COUNT: 13.5 %
GLOBULIN PLAS-MCNC: 2.3 G/DL (ref 2–3.5)
GLUCOSE BLD-MCNC: 121 MG/DL (ref 70–99)
HCT VFR BLD AUTO: 39.4 %
HGB BLD-MCNC: 13.9 G/DL
IMM GRANULOCYTES # BLD AUTO: 0.03 X10(3) UL (ref 0–1)
IMM GRANULOCYTES NFR BLD: 0.4 %
LYMPHOCYTES # BLD AUTO: 2.07 X10(3) UL (ref 1–4)
LYMPHOCYTES NFR BLD AUTO: 30.5 %
MCH RBC QN AUTO: 28.8 PG (ref 26–34)
MCHC RBC AUTO-ENTMCNC: 35.3 G/DL (ref 31–37)
MCV RBC AUTO: 81.7 FL
MONOCYTES # BLD AUTO: 0.66 X10(3) UL (ref 0.1–1)
MONOCYTES NFR BLD AUTO: 9.7 %
NEUTROPHILS # BLD AUTO: 3.76 X10 (3) UL (ref 1.5–7.7)
NEUTROPHILS # BLD AUTO: 3.76 X10(3) UL (ref 1.5–7.7)
NEUTROPHILS NFR BLD AUTO: 55.5 %
OSMOLALITY SERPL CALC.SUM OF ELEC: 289 MOSM/KG (ref 275–295)
P AXIS: 46 DEGREES
P-R INTERVAL: 168 MS
PLATELET # BLD AUTO: 206 10(3)UL (ref 150–450)
POTASSIUM SERPL-SCNC: 4 MMOL/L (ref 3.5–5.1)
PROT SERPL-MCNC: 6.9 G/DL (ref 5.7–8.2)
Q-T INTERVAL: 392 MS
QRS DURATION: 90 MS
QTC CALCULATION (BEZET): 423 MS
R AXIS: 14 DEGREES
RBC # BLD AUTO: 4.82 X10(6)UL
SARS-COV-2 RNA RESP QL NAA+PROBE: NOT DETECTED
SODIUM SERPL-SCNC: 139 MMOL/L (ref 136–145)
T AXIS: 12 DEGREES
VENTRICULAR RATE: 70 BPM
WBC # BLD AUTO: 6.8 X10(3) UL (ref 4–11)

## 2024-08-19 PROCEDURE — 85025 COMPLETE CBC W/AUTO DIFF WBC: CPT | Performed by: EMERGENCY MEDICINE

## 2024-08-19 PROCEDURE — 80053 COMPREHEN METABOLIC PANEL: CPT

## 2024-08-19 PROCEDURE — 71045 X-RAY EXAM CHEST 1 VIEW: CPT

## 2024-08-19 PROCEDURE — 85025 COMPLETE CBC W/AUTO DIFF WBC: CPT

## 2024-08-19 PROCEDURE — 93005 ELECTROCARDIOGRAM TRACING: CPT

## 2024-08-19 PROCEDURE — 80053 COMPREHEN METABOLIC PANEL: CPT | Performed by: EMERGENCY MEDICINE

## 2024-08-19 PROCEDURE — 70450 CT HEAD/BRAIN W/O DYE: CPT | Performed by: EMERGENCY MEDICINE

## 2024-08-19 RX ORDER — ONDANSETRON 2 MG/ML
4 INJECTION INTRAMUSCULAR; INTRAVENOUS ONCE
Status: COMPLETED | OUTPATIENT
Start: 2024-08-19 | End: 2024-08-19

## 2024-08-19 RX ORDER — DIPHENHYDRAMINE HYDROCHLORIDE 50 MG/ML
25 INJECTION INTRAMUSCULAR; INTRAVENOUS ONCE
Status: COMPLETED | OUTPATIENT
Start: 2024-08-19 | End: 2024-08-19

## 2024-08-19 RX ORDER — ATORVASTATIN CALCIUM 20 MG/1
20 TABLET, FILM COATED ORAL NIGHTLY
Qty: 30 TABLET | Refills: 0 | Status: SHIPPED | OUTPATIENT
Start: 2024-08-19 | End: 2024-08-21

## 2024-08-19 RX ORDER — LOSARTAN POTASSIUM AND HYDROCHLOROTHIAZIDE 12.5; 5 MG/1; MG/1
1 TABLET ORAL DAILY
Qty: 30 TABLET | Refills: 0 | Status: SHIPPED | OUTPATIENT
Start: 2024-08-19 | End: 2024-08-21

## 2024-08-19 RX ORDER — KETOROLAC TROMETHAMINE 30 MG/ML
30 INJECTION, SOLUTION INTRAMUSCULAR; INTRAVENOUS ONCE
Status: COMPLETED | OUTPATIENT
Start: 2024-08-19 | End: 2024-08-19

## 2024-08-19 NOTE — ED PROVIDER NOTES
Patient Seen in: [facility] Emergency Department      History     Chief Complaint   Patient presents with    Hypertension     Stated Complaint: ELEVATED BP AND DIZZINESS , ON MEDS 144/111 AT HOME    Subjective:   HPI    Elevated BP and global headache.  This is a 39-year-old gentleman who had an elevated blood pressure at home he has a history of hypertension hyperlipidemia he also had a headache he is not sure what came first the headache with the blood pressure because he took the blood pressure after he had a headache.  He presents to the emergency department for treatment and evaluation his wife is with him.  He denies any fevers or chills or neck pain he does not normally have headaches      Objective:   Past Medical History:    Essential hypertension    Hyperlipidemia              Past Surgical History:   Procedure Laterality Date    Tonsillectomy                  Social History     Socioeconomic History    Marital status:    Tobacco Use    Smoking status: Never     Passive exposure: Never    Smokeless tobacco: Never   Vaping Use    Vaping status: Never Used   Substance and Sexual Activity    Alcohol use: Not Currently    Drug use: Never    Sexual activity: Yes     Partners: Female   Other Topics Concern     Service No    Blood Transfusions No    Caffeine Concern No    Occupational Exposure No    Hobby Hazards No    Sleep Concern No    Stress Concern No    Special Diet No    Back Care Yes    Exercise No    Bike Helmet Yes    Seat Belt Yes    Self-Exams Yes     Social Determinants of Health     Financial Resource Strain: Not on File (2/22/2023)    Received from [facility]    Financial Resource Strain     Financial Resource Strain: 0   Food Insecurity: Not on File (2/22/2023)    Received from [facility]    Food Insecurity     Food: 0   Transportation Needs: Not on File (2/22/2023)    Received from [facility]    Transportation Needs     Transportation: 0   Physical Activity: Not on  File (2023)    Received from [facility]    Physical Activity     Physical Activity: 0   Stress: Not on File (2023)    Received from [facility]    Stress     Stress: 0   Social Connections: Not on File (2023)    Received from [facility]    Social Connections     Social Connections and Isolation: 0   Housing Stability: Not on File (2023)    Received from [facility]    Housing Stability     Housin              Review of Systems    Positive for stated Chief Complaint: Hypertension    Other systems are as noted in HPI.  Constitutional and vital signs reviewed.      All other systems reviewed and negative except as noted above.    Physical Exam     ED Triage Vitals   BP 24 (!) 144/101   Pulse 24 71   Resp 24 16   Temp 24 014 98.3 °F (36.8 °C)   Temp src 24 Temporal   SpO2 24 100 %   O2 Device 24 None (Room air)       Current Vitals:   No data recorded          Physical Exam    Well-developed well-nourished gentleman lying on emergency department bed he is in no acute distress but he is mildly anxious appearing his HEENT exam reveals pupils are equal round reactive light his extraocular moods are intact his neck is supple without significant anterior cervical lymphadenopathy or meningeal signs.  His lungs are clear to auscultation his heart has regular rate and rhythm without murmurs rubs or gallops his abdomen is soft nontender nondistended.  He is moving 4 extremities with no neurologic deficits noted.    ED Course     Labs Reviewed   COMP METABOLIC PANEL (14) - Abnormal; Notable for the following components:       Result Value    Glucose 121 (*)     Bilirubin, Total 1.3 (*)     All other components within normal limits   RAPID SARS-COV-2 BY PCR - Normal   CBC WITH DIFFERENTIAL WITH PLATELET   RAINBOW DRAW LAVENDER   RAINBOW DRAW LIGHT GREEN   RAINBOW DRAW BLUE   RAINBOW DRAW GOLD     EKG    Rate, intervals and axes  as noted on EKG Report.  Rate: 70  Rhythm: Sinus Rhythm  Readin bpm normal sinus rhythm no acute ST elevation or significant ST depression to suggest acute ischemia.  Benign EKG         Chest x-ray is independently interpreted by myself does not reveal any signs of significant cardiomegaly, pulmonary vascular congestion, pneumonia     ED Course as of 24 1221  ------------------------------------------------------------  Time:  0140  Value: Glucose(!): 121  Comment: (Reviewed)     CT of the brain done, thankfully no mass occupying lesion, cyst, signs of hydrocephalus,         MDM      39-year-old male presents to the emergency department with global headache, elevated blood pressure at home he presents for treatment and evaluation.  His does have a history of hypertension he has been compliant with his medications.  Differential diagnosis includes but is not limited to intracranial hemorrhage, migraine headache, COVID-19, anxiety reaction, hypertensive emergency causing headache, dehydration.  Discussed all of this with the patient     Thankfully no signs of hypertensive heart disease noted blood pressure improving particular the diastolic on its own.  Headache was treated symptomatically with saline Benadryl Toradol and Zofran patient's symptoms significantly improved as patient's symptoms improved so due to his blood pressure.  We discussed that it is difficult to tell which 1 of these entities actually caused which but I would like him to keep an eye on his blood pressure we also discussed that pain and anxiety itself drive up the diastolic blood pressure he expressed understanding.  He is going to continue to take his blood pressure medicines monitor his headache his COVID testing is negative the headache is improved he has not been sleeping particularly well at time I pointed out that could certainly trigger headaches                                   Medical Decision Making      Disposition and  Plan     Clinical Impression:  1. Secondary hypertension    2. Nonintractable headache, unspecified chronicity pattern, unspecified headache type    3. Encounter for medication refill         Disposition:  Discharge  8/19/2024  5:15 am    Follow-up:  Aviva Canas  [address] [address]  [phone]    Follow up            Medications Prescribed:  Discharge Medication List as of 8/19/2024  5:16 AM        START taking these medications    Details   !! losartan-hydroCHLOROthiazide 50-12.5 MG Oral Tab Take 1 tablet by mouth daily., Print, Disp-30 tablet, R-0      !! atorvastatin 20 MG Oral Tab Take 1 tablet (20 mg total) by mouth nightly., Print, Disp-30 tablet, R-0      !! metFORMIN 500 MG Oral Tab Take 1 tablet (500 mg total) by mouth 2 (two) times daily with meals., Print, Disp-60 tablet, R-0       !! - Potential duplicate medications found. Please discuss with provider.

## 2024-08-19 NOTE — ED INITIAL ASSESSMENT (HPI)
Pt started having a headache earlier in the day accompanied by dizziness and nausea. Pt normally takes losartan but he ran out of it so he was unable to take it. Pt missed his doses yesterday and today. Pt tried taking telemisartan from [place] that he was previously prescribed. Pt speaking clearly, no slurred speech, no unilateral weakness, no loss of balance, no facial drooping. Pt denies sob, and chest pains at this time

## 2024-08-21 ENCOUNTER — OFFICE VISIT (OUTPATIENT)
Dept: INTERNAL MEDICINE CLINIC | Facility: CLINIC | Age: 39
End: 2024-08-21
Payer: COMMERCIAL

## 2024-08-21 VITALS
OXYGEN SATURATION: 96 % | HEIGHT: 68.4 IN | TEMPERATURE: 98 F | WEIGHT: 234.19 LBS | SYSTOLIC BLOOD PRESSURE: 120 MMHG | RESPIRATION RATE: 16 BRPM | DIASTOLIC BLOOD PRESSURE: 68 MMHG | BODY MASS INDEX: 35.09 KG/M2 | HEART RATE: 87 BPM

## 2024-08-21 DIAGNOSIS — E78.5 HYPERLIPIDEMIA, UNSPECIFIED HYPERLIPIDEMIA TYPE: ICD-10-CM

## 2024-08-21 DIAGNOSIS — E66.9 OBESITY (BMI 30-39.9): ICD-10-CM

## 2024-08-21 DIAGNOSIS — R73.03 PREDIABETES: ICD-10-CM

## 2024-08-21 DIAGNOSIS — I10 PRIMARY HYPERTENSION: Primary | ICD-10-CM

## 2024-08-21 PROCEDURE — 99214 OFFICE O/P EST MOD 30 MIN: CPT | Performed by: INTERNAL MEDICINE

## 2024-08-21 RX ORDER — LOSARTAN POTASSIUM AND HYDROCHLOROTHIAZIDE 12.5; 5 MG/1; MG/1
1 TABLET ORAL DAILY
Qty: 90 TABLET | Refills: 3 | Status: SHIPPED | OUTPATIENT
Start: 2024-08-21

## 2024-08-21 RX ORDER — ATORVASTATIN CALCIUM 20 MG/1
20 TABLET, FILM COATED ORAL NIGHTLY
Qty: 90 TABLET | Refills: 3 | Status: SHIPPED | OUTPATIENT
Start: 2024-08-21

## 2024-08-21 RX ORDER — SEMAGLUTIDE 0.68 MG/ML
0.25 INJECTION, SOLUTION SUBCUTANEOUS WEEKLY
Qty: 1 EACH | Refills: 0 | Status: SHIPPED | OUTPATIENT
Start: 2024-08-21

## 2024-08-21 NOTE — PROGRESS NOTES
Subjective:   Shailesh Amni is a 39 year old male  who presents for Medication Follow-Up     HTN-controlled on medication. Reports feeling fine now.   He went to ER recently because he ran out of medication   preDM- on metformin   HLD- on statin   Obesity -pt wants to try ozempic. Discussed need for lifestyle changes as well.   And close follow-up   History/Other:    Chief Complaint Reviewed and Verified  No Further Nursing Notes to   Review  Tobacco Reviewed  Allergies Reviewed  Medications Reviewed         Current Outpatient Medications   Medication Sig Dispense Refill    losartan-hydroCHLOROthiazide 50-12.5 MG Oral Tab Take 1 tablet by mouth daily. 90 tablet 3    metFORMIN 500 MG Oral Tab Take 1 tablet (500 mg total) by mouth 2 (two) times daily with meals. 180 tablet 3    atorvastatin 20 MG Oral Tab Take 1 tablet (20 mg total) by mouth nightly. 90 tablet 3    semaglutide (OZEMPIC, 0.25 OR 0.5 MG/DOSE,) 2 MG/3ML Subcutaneous Solution Pen-injector Inject 0.25 mg into the skin once a week. 1 each 0       Review of Systems:  Pertinent items are noted in HPI.  A comprehensive 10 point review of systems was completed.  Pertinent positives and negatives noted in the the HPI.        Objective:   /68 (BP Location: Left arm, Patient Position: Sitting, Cuff Size: large)   Pulse 87   Temp 98.4 °F (36.9 °C) (Temporal)   Resp 16   Ht 5' 8.4\" (1.737 m)   Wt 234 lb 3.2 oz (106.2 kg)   SpO2 96%   BMI 35.19 kg/m²  Estimated body mass index is 35.19 kg/m² as calculated from the following:    Height as of this encounter: 5' 8.4\" (1.737 m).    Weight as of this encounter: 234 lb 3.2 oz (106.2 kg).  PHYSICAL EXAM:   General: no acute distress   Respiratory: no increased work of breathing; good air exchange; CTAB; no crackles or wheezing   Cardiovascular: RRR; S1, S2; no murmurs  Neurological: awake, alert, oriented x3; CNII-XII grossly intact;   Behavioral/Psych: euthymic; appropriate affect        Assessment & Plan:   Shailesh Stevenson was seen today for medication follow-up.    Diagnoses and all orders for this visit:    Primary hypertension  -     Comp Metabolic Panel (14); Future  -     Hemoglobin A1C; Future  -     TSH W Reflex To Free T4; Future  -     Lipid Panel; Future  -losartan-hydrochlorothiazide     Hyperlipidemia, unspecified hyperlipidemia type  -     Comp Metabolic Panel (14); Future  -     Hemoglobin A1C; Future  -     TSH W Reflex To Free T4; Future  -     Lipid Panel; Future  -     atorvastatin 20 MG Oral Tab; Take 1 tablet (20 mg total) by mouth nightly.    Prediabetes  -     Comp Metabolic Panel (14); Future  -     Hemoglobin A1C; Future  -     TSH W Reflex To Free T4; Future  -     Lipid Panel; Future  -     metFORMIN 500 MG Oral Tab; Take 1 tablet (500 mg total) by mouth 2 (two) times daily with meals.  -check to see if ozempic covered     Obesity (BMI 30-39.9)  -     semaglutide (OZEMPIC, 0.25 OR 0.5 MG/DOSE,) 2 MG/3ML Subcutaneous Solution Pen-injector; Inject 0.25 mg into the skin once a week.  -diet/exercise   -close follow-up             Aviva Cruz MD

## 2024-08-31 ENCOUNTER — LAB ENCOUNTER (OUTPATIENT)
Dept: LAB | Age: 39
End: 2024-08-31
Attending: INTERNAL MEDICINE
Payer: COMMERCIAL

## 2024-08-31 DIAGNOSIS — R73.03 PREDIABETES: ICD-10-CM

## 2024-08-31 DIAGNOSIS — E78.5 HYPERLIPIDEMIA, UNSPECIFIED HYPERLIPIDEMIA TYPE: ICD-10-CM

## 2024-08-31 DIAGNOSIS — I10 PRIMARY HYPERTENSION: ICD-10-CM

## 2024-08-31 LAB
ALBUMIN SERPL-MCNC: 4.8 G/DL (ref 3.2–4.8)
ALBUMIN/GLOB SERPL: 2.2 {RATIO} (ref 1–2)
ALP LIVER SERPL-CCNC: 68 U/L
ALT SERPL-CCNC: 35 U/L
ANION GAP SERPL CALC-SCNC: 9 MMOL/L (ref 0–18)
AST SERPL-CCNC: 21 U/L (ref ?–34)
BILIRUB SERPL-MCNC: 1.1 MG/DL (ref 0.3–1.2)
BUN BLD-MCNC: 19 MG/DL (ref 9–23)
CALCIUM BLD-MCNC: 9.4 MG/DL (ref 8.7–10.4)
CHLORIDE SERPL-SCNC: 107 MMOL/L (ref 98–112)
CHOLEST SERPL-MCNC: 150 MG/DL (ref ?–200)
CO2 SERPL-SCNC: 27 MMOL/L (ref 21–32)
CREAT BLD-MCNC: 1 MG/DL
EGFRCR SERPLBLD CKD-EPI 2021: 97 ML/MIN/1.73M2 (ref 60–?)
EST. AVERAGE GLUCOSE BLD GHB EST-MCNC: 117 MG/DL (ref 68–126)
FASTING PATIENT LIPID ANSWER: YES
FASTING STATUS PATIENT QL REPORTED: YES
GLOBULIN PLAS-MCNC: 2.2 G/DL (ref 2–3.5)
GLUCOSE BLD-MCNC: 106 MG/DL (ref 70–99)
HBA1C MFR BLD: 5.7 % (ref ?–5.7)
HDLC SERPL-MCNC: 44 MG/DL (ref 40–59)
LDLC SERPL CALC-MCNC: 81 MG/DL (ref ?–100)
NONHDLC SERPL-MCNC: 106 MG/DL (ref ?–130)
OSMOLALITY SERPL CALC.SUM OF ELEC: 299 MOSM/KG (ref 275–295)
POTASSIUM SERPL-SCNC: 4 MMOL/L (ref 3.5–5.1)
PROT SERPL-MCNC: 7 G/DL (ref 5.7–8.2)
SODIUM SERPL-SCNC: 143 MMOL/L (ref 136–145)
TRIGL SERPL-MCNC: 142 MG/DL (ref 30–149)
TSI SER-ACNC: 0.89 MIU/ML (ref 0.55–4.78)
VLDLC SERPL CALC-MCNC: 22 MG/DL (ref 0–30)

## 2024-08-31 PROCEDURE — 80061 LIPID PANEL: CPT

## 2024-08-31 PROCEDURE — 83036 HEMOGLOBIN GLYCOSYLATED A1C: CPT

## 2024-08-31 PROCEDURE — 80053 COMPREHEN METABOLIC PANEL: CPT

## 2024-08-31 PROCEDURE — 36415 COLL VENOUS BLD VENIPUNCTURE: CPT

## 2024-08-31 PROCEDURE — 84443 ASSAY THYROID STIM HORMONE: CPT

## 2025-02-13 ENCOUNTER — OFFICE VISIT (OUTPATIENT)
Dept: INTERNAL MEDICINE CLINIC | Facility: CLINIC | Age: 40
End: 2025-02-13
Payer: COMMERCIAL

## 2025-02-13 VITALS
TEMPERATURE: 99 F | DIASTOLIC BLOOD PRESSURE: 68 MMHG | OXYGEN SATURATION: 96 % | SYSTOLIC BLOOD PRESSURE: 122 MMHG | HEIGHT: 68 IN | BODY MASS INDEX: 35.22 KG/M2 | WEIGHT: 232.38 LBS | HEART RATE: 78 BPM

## 2025-02-13 DIAGNOSIS — R73.03 PREDIABETES: ICD-10-CM

## 2025-02-13 DIAGNOSIS — E66.9 OBESITY (BMI 30-39.9): ICD-10-CM

## 2025-02-13 DIAGNOSIS — I10 HYPERTENSION, UNSPECIFIED TYPE: Primary | ICD-10-CM

## 2025-02-13 PROBLEM — E55.9 VITAMIN D DEFICIENCY: Status: RESOLVED | Noted: 2023-11-21 | Resolved: 2025-02-13

## 2025-02-13 PROCEDURE — 99214 OFFICE O/P EST MOD 30 MIN: CPT | Performed by: INTERNAL MEDICINE

## 2025-02-13 NOTE — PROGRESS NOTES
Subjective:   Shailesh Amin is a 39 year old male  who presents for Medication Follow-Up     HTN-controlled.   Denies cp/sob/angina   preDM/obese- has been on metformin. Not many changes in diet but some carb changes.   HLD- on statin controlled. Repeat lab due  8/2025    History/Other:    Chief Complaint Reviewed and Verified  No Further Nursing Notes to   Review  Tobacco Reviewed  Allergies Reviewed  Medications Reviewed    Medical History Reviewed  Surgical History Reviewed  Family History   Reviewed  Social History Reviewed         Current Outpatient Medications   Medication Sig Dispense Refill    losartan-hydroCHLOROthiazide 50-12.5 MG Oral Tab Take 1 tablet by mouth daily. 90 tablet 3    metFORMIN 500 MG Oral Tab Take 1 tablet (500 mg total) by mouth 2 (two) times daily with meals. 180 tablet 3    atorvastatin 20 MG Oral Tab Take 1 tablet (20 mg total) by mouth nightly. 90 tablet 3       Review of Systems:  Pertinent items are noted in HPI.  A comprehensive 10 point review of systems was completed.  Pertinent positives and negatives noted in the the HPI.        Objective:   /68 (BP Location: Left arm, Patient Position: Sitting, Cuff Size: adult)   Pulse 78   Temp 98.6 °F (37 °C) (Temporal)   Ht 5' 8\" (1.727 m)   Wt 232 lb 6.4 oz (105.4 kg)   SpO2 96%   BMI 35.34 kg/m²  Estimated body mass index is 35.34 kg/m² as calculated from the following:    Height as of this encounter: 5' 8\" (1.727 m).    Weight as of this encounter: 232 lb 6.4 oz (105.4 kg).  PHYSICAL EXAM:   General: no acute distress   Respiratory: no increased work of breathing; good air exchange; CTAB; no crackles or wheezing   Cardiovascular: RRR; S1, S2; no edema   Neurological: awake, alert, oriented x3; CNII-XII grossly intact;  Behavioral/Psych: euthymic; appropriate affect     Assessment & Plan:   Shailesh Stevenson was seen today for medication follow-up.    Diagnoses and all orders for this  visit:    Hypertension, unspecified type  -     Basic Metabolic Panel (8) [E]; Future  -losartan- hydrochlorothiazide    Prediabetes  Obesity (BMI 30-39.9)  -     Basic Metabolic Panel (8) [E]; Future  -     Hemoglobin A1C [E]; Future  -metformin  -diet/exercise encouraged.                   Aviva Cruz MD

## 2025-02-24 DIAGNOSIS — I10 PRIMARY HYPERTENSION: ICD-10-CM

## 2025-02-25 RX ORDER — LOSARTAN POTASSIUM AND HYDROCHLOROTHIAZIDE 12.5; 5 MG/1; MG/1
1 TABLET ORAL DAILY
Qty: 90 TABLET | Refills: 3 | Status: SHIPPED | OUTPATIENT
Start: 2025-02-25

## 2025-02-25 NOTE — TELEPHONE ENCOUNTER
Requesting    losartan-hydroCHLOROthiazide 50-12.5 MG Oral Tab         Sig: Take 1 tablet by mouth daily.    Disp: 90 tablet    Refills: 3    Start: 2/24/2025    Class: Normal    Non-formulary For: Primary hypertension    Last ordered: 6 months ago (8/21/2024) by Aviva Cruz MD    Hypertension Medications Protocol [id number]/24/2025 03:59 PM   Protocol Details CMP or BMP in past 12 months    Last BP reading less than 140/90    In person appointment or virtual visit in the past 12 mos or appointment in next 3 mos    EGFRCR or GFRNAA > 50    Medication is active on med list      To be filled at: [facility] [id number] [address] [place], [place] - [address] [phone], [phone]        LOV: 02/21/25 w/ AMANDA  RTC: 08/13/25  Last Relevant Labs: 08/31/24  No future appointments.